# Patient Record
Sex: MALE | Race: ASIAN | NOT HISPANIC OR LATINO | URBAN - METROPOLITAN AREA
[De-identification: names, ages, dates, MRNs, and addresses within clinical notes are randomized per-mention and may not be internally consistent; named-entity substitution may affect disease eponyms.]

---

## 2017-09-05 ENCOUNTER — EMERGENCY (EMERGENCY)
Facility: HOSPITAL | Age: 22
LOS: 1 days | Discharge: HOME | End: 2017-09-05
Attending: PERSONAL EMERGENCY RESPONSE ATTENDANT | Admitting: PERSONAL EMERGENCY RESPONSE ATTENDANT
Payer: MEDICAID

## 2017-09-05 VITALS — WEIGHT: 139.99 LBS

## 2017-09-05 VITALS
HEART RATE: 65 BPM | OXYGEN SATURATION: 99 % | SYSTOLIC BLOOD PRESSURE: 112 MMHG | DIASTOLIC BLOOD PRESSURE: 57 MMHG | RESPIRATION RATE: 16 BRPM | TEMPERATURE: 100 F

## 2017-09-05 PROCEDURE — 70487 CT MAXILLOFACIAL W/DYE: CPT

## 2017-09-05 PROCEDURE — 99284 EMERGENCY DEPT VISIT MOD MDM: CPT

## 2017-09-05 PROCEDURE — 99284 EMERGENCY DEPT VISIT MOD MDM: CPT | Mod: 25

## 2017-09-05 PROCEDURE — 70487 CT MAXILLOFACIAL W/DYE: CPT | Mod: 26

## 2017-09-05 RX ORDER — IBUPROFEN 200 MG
600 TABLET ORAL ONCE
Qty: 0 | Refills: 0 | Status: COMPLETED | OUTPATIENT
Start: 2017-09-05 | End: 2017-09-05

## 2017-09-05 RX ADMIN — Medication 600 MILLIGRAM(S): at 09:32

## 2017-09-05 RX ADMIN — Medication 600 MILLIGRAM(S): at 10:06

## 2017-09-05 RX ADMIN — Medication 1 TABLET(S): at 10:06

## 2017-09-05 NOTE — ED ADULT NURSE NOTE - OBJECTIVE STATEMENT
0910a  22 y/o male to er ambulatory w/sister w/c/o mouth pain ,gum pain and bleeding wisdom tooth. has had discomfort and pain for several days. no diff breathing or diff swallowing. no fever chills.

## 2017-09-05 NOTE — ED PROVIDER NOTE - ATTENDING CONTRIBUTION TO CARE
Attending MD Bateman.  Agree with above.  Pt is a 21 yr old male who presents after he has been to the dentist 3 times over the last week and told he has a 'gum infection'.  He's had a 'deep cleaning' and has not been dosed abxs.  No fevers, no abscess evidence.  TTP over L mandibular gums.  No systemic sxs infection.  Mil doozing of blood from L gum line.  No noted swelling.  No other known medical problems.  No n/v/d. Attending MD Bateman.  Agree with above.  Pt is a 21 yr old male who presents after he has been to the dentist 3 times over the last week and told he has a 'gum infection'.  He's had a 'deep cleaning' and has not been dosed abxs.  No fevers, no abscess evidence.  TTP over L mandibular gums.  No systemic sxs infection.  Mil doozing of blood from L gum line.  No noted swelling.  No other known medical problems.  No n/v/d.  Pt with temp high 90's.  Had fever last week per family member.  No trismus.  Some TTP to L submandibular space.  No submental swelling. Managing secretions.  Evidence of diffuse mild gingivitis with obvious TTP and soft tissue TTP over L wisdom tooth.  Pt reportedly told he has wisdom tooth needing extraction per dental.  Pt has seen 4 doctors in the last week.  No abxs yet.

## 2017-09-05 NOTE — ED PROVIDER NOTE - OBJECTIVE STATEMENT
22yo male otherwise healthy p/w left lower tooth pain. Pt was seen by dentist last week, had "deep cleaning done" and now complains for tooth pain and bleeding,. Denies fevers, chills, vomiting, swelling. + difficulty eating and opening mouth.

## 2017-09-05 NOTE — ED ADULT NURSE REASSESSMENT NOTE - NS ED NURSE REASSESS COMMENT FT1
Received report from nurse Mesha Woods at 09:30am.  No acute respiratory distress noted, v/s obtained Received report from nurse Mesha Woods at 09:37am.  No acute respiratory distress noted, v/s obtained

## 2017-09-05 NOTE — ED PROVIDER NOTE - PROGRESS NOTE DETAILS
[place]son: pain improved, no active bleeding, no abscess on CT. pt to follow up with dental [facility]:  No active bleeding, no evidence of abscess on visualization/CT.  Pt advised that he needs to see a dentist for probable wisdom tooth extraction and definitive care.  Dosed augmentin in ED and give Rx.  Return to ED for any new/worsening sxs including inability to open mouth, difficulty breathing, difficulty managing secretions.  Stable for discharge and verbalizes understanding of above.

## 2022-11-02 NOTE — ED PROVIDER NOTE - PMH
No pertinent past medical history
was dx with pna 1 month ago- reports productive cough and chills x 2 weeks-